# Patient Record
(demographics unavailable — no encounter records)

---

## 2024-12-11 NOTE — REVIEW OF SYSTEMS
[Palpitations] : palpitations [Anxiety] : anxiety [Fever] : no fever [Chest Pain] : no chest pain [Shortness Of Breath] : no shortness of breath [Abdominal Pain] : no abdominal pain [Joint Pain] : no joint pain

## 2024-12-11 NOTE — PHYSICAL EXAM
[No Acute Distress] : no acute distress [Soft] : abdomen soft [No CVA Tenderness] : no CVA  tenderness [de-identified] : irregular rate 140 [de-identified] : trace bilateral lower extremity edema

## 2024-12-11 NOTE — HISTORY OF PRESENT ILLNESS
[FreeTextEntry1] : 2 week hx palpitations [de-identified] : 79 yo female with past hx AF taking meds as noted on eliquis c/o 2 week hx palpitations there is mild lightheadedness no chest pain and no dyspnea

## 2024-12-11 NOTE — HEALTH RISK ASSESSMENT
[No] : In the past 12 months have you used drugs other than those required for medical reasons? No [No falls in past year] : Patient reported no falls in the past year [0] : 2) Feeling down, depressed, or hopeless: Not at all (0) [Never] : Never [Audit-CScore] : 0 [de-identified] : walking [de-identified] : good [[id number]] : 0

## 2024-12-14 NOTE — HISTORY OF PRESENT ILLNESS
[FreeTextEntry1] : feels somewhat improved  palptitation essentiaklly resolved [de-identified] : currently taking metoprolol tartrate 50 mg q 12 h feels improved palpiation essentially resolved  no dyspnea or chest pain

## 2024-12-14 NOTE — HISTORY OF PRESENT ILLNESS
[FreeTextEntry1] : feels somewhat improved  palptitation essentiaklly resolved [de-identified] : currently taking metoprolol tartrate 50 mg q 12 h feels improved palpiation essentially resolved  no dyspnea or chest pain

## 2024-12-14 NOTE — PLAN
[FreeTextEntry1] : will increase metoprolol to 75 mg Q12h cardiology consult Dr Armendariz start lipitor for hypercholesterolemia

## 2024-12-14 NOTE — HEALTH RISK ASSESSMENT
[No] : In the past 12 months have you used drugs other than those required for medical reasons? No [0] : 2) Feeling down, depressed, or hopeless: Not at all (0) [Never] : Never [de-identified] : no [de-identified] : walking  [de-identified] : good

## 2024-12-14 NOTE — ASSESSMENT
[FreeTextEntry1] : clinically improved  no further symptoms will increase metoprolol tart to 75 mg q12h

## 2024-12-14 NOTE — HEALTH RISK ASSESSMENT
[No] : In the past 12 months have you used drugs other than those required for medical reasons? No [0] : 2) Feeling down, depressed, or hopeless: Not at all (0) [Never] : Never [de-identified] : no [de-identified] : walking  [de-identified] : good

## 2024-12-14 NOTE — PHYSICAL EXAM
[No Acute Distress] : no acute distress [Declined Breast Exam] : declined breast exam  [Soft] : abdomen soft [No CVA Tenderness] : no CVA  tenderness [No Focal Deficits] : no focal deficits [Normal Gait] : normal gait [de-identified] : irregular rate 100 [de-identified] : trace bilateral lower extremity edema

## 2024-12-14 NOTE — PHYSICAL EXAM
[No Acute Distress] : no acute distress [Declined Breast Exam] : declined breast exam  [Soft] : abdomen soft [No CVA Tenderness] : no CVA  tenderness [No Focal Deficits] : no focal deficits [Normal Gait] : normal gait [de-identified] : irregular rate 100 [de-identified] : trace bilateral lower extremity edema

## 2025-05-08 NOTE — REVIEW OF SYSTEMS
[Palpitations] : palpitations [Anxiety] : anxiety [Fever] : no fever [Chest Pain] : no chest pain [Shortness Of Breath] : no shortness of breath [Abdominal Pain] : no abdominal pain [Dysuria] : no dysuria [Joint Pain] : no joint pain [Headache] : no headache

## 2025-05-08 NOTE — PLAN
[FreeTextEntry1] : will increase metoprolol to 75 mg q12 needs cardiac evaluation[consullt written previously] blood sent for routine labs

## 2025-05-08 NOTE — HEALTH RISK ASSESSMENT
[No] : In the past 12 months have you used drugs other than those required for medical reasons? No [No falls in past year] : Patient reported no falls in the past year [0] : 2) Feeling down, depressed, or hopeless: Not at all (0) [de-identified] : No [de-identified] : No [de-identified] : Little exercisies at home, little walks  [de-identified] : Regular  [de-identified] : No

## 2025-05-08 NOTE — HISTORY OF PRESENT ILLNESS
[FreeTextEntry1] : palpitation [de-identified] : 3 days ago while in bed noted sensation of palpitation lasted brief period no associated symptoms currently feels entirely well no recent CP or dyspnea

## 2025-05-08 NOTE — PHYSICAL EXAM
[No Acute Distress] : no acute distress [No JVD] : no jugular venous distention [Clear to Auscultation] : lungs were clear to auscultation bilaterally [Normal Rate] : normal rate  [No Edema] : there was no peripheral edema [Soft] : abdomen soft [No CVA Tenderness] : no CVA  tenderness [No Joint Swelling] : no joint swelling [No Rash] : no rash [Alert and Oriented x3] : oriented to person, place, and time